# Patient Record
Sex: MALE | URBAN - METROPOLITAN AREA
[De-identification: names, ages, dates, MRNs, and addresses within clinical notes are randomized per-mention and may not be internally consistent; named-entity substitution may affect disease eponyms.]

---

## 2021-11-29 ENCOUNTER — OFFICE VISIT (OUTPATIENT)
Dept: URBAN - METROPOLITAN AREA CLINIC 4 | Facility: CLINIC | Age: 51
End: 2021-11-29

## 2021-11-29 ASSESSMENT — INTRAOCULAR PRESSURE
OD: 13
OS: 10

## 2021-11-29 ASSESSMENT — KERATOMETRY
OS: 45.00
OD: 44.25

## 2021-11-29 NOTE — IMPRESSION/PLAN
Impression: Other visual disturbance: H53.8. Plan: Laser Vision Correction Evaluation: Pentacam today shows regular astigmatism w/o signs of posterior corneal ectasia. Keratometry and pachymetry WNL. AR shows refractive error within corrective limits. Patient with reasonable expectations and no major contraindications for LASIK left eye only to improve distance vision. Contact lens trial today OS 0.00-1.25x020 for better distance/ OD no contact lens to retain near. Recommend formal evaluation for refractive surgery after discontinuing contact lenses for at least 2 weeks. Begin AT's and lid hygiene. Gave pricing and surgery dates available. RTC if interested.

## 2021-12-02 ENCOUNTER — TESTING ONLY (OUTPATIENT)
Dept: URBAN - METROPOLITAN AREA CLINIC 4 | Facility: CLINIC | Age: 51
End: 2021-12-02

## 2021-12-02 NOTE — IMPRESSION/PLAN
Impression: Other visual disturbance: H53.8. Plan: Repeat contact lens trial today OS 0.00-1.25x020 for better distance/ OD no contact lens to retain near. Plan to keep for 3-5 days and then remove, if happy with monovision, recommend a formal evaluation for LASIK in the left eye only.

## 2022-01-12 ENCOUNTER — OFFICE VISIT (OUTPATIENT)
Dept: URBAN - METROPOLITAN AREA CLINIC 4 | Facility: CLINIC | Age: 52
End: 2022-01-12

## 2022-01-12 DIAGNOSIS — H53.8 OTHER VISUAL DISTURBANCE: Primary | ICD-10-CM

## 2022-01-12 ASSESSMENT — KERATOMETRY
OS: 44.88
OD: 44.25

## 2022-01-12 NOTE — IMPRESSION/PLAN
Impression: Other visual disturbance: H53.8. Plan: Laser Vision Correction Evaluation: Repeat Pentacam today shows regular astigmatism w/o signs of posterior corneal ectasia. Keratometry and pachymetry WNL. AR shows refractive error within corrective limits. Patient underwent a successful monovision CL trial Left eye only corrected for distance (right with natural myopia). Desires to proceed with LASIK in the Left eye only. Plan to schedule a formal evaluation n/a.

## 2022-01-20 ENCOUNTER — REFRACTIVE (OUTPATIENT)
Dept: URBAN - METROPOLITAN AREA CLINIC 4 | Facility: CLINIC | Age: 52
End: 2022-01-20

## 2022-01-20 ASSESSMENT — KERATOMETRY
OD: 44.38
OS: 45.00

## 2022-01-21 ENCOUNTER — REFRACTIVE (OUTPATIENT)
Dept: URBAN - METROPOLITAN AREA CLINIC 4 | Facility: CLINIC | Age: 52
End: 2022-01-21

## 2022-01-22 ENCOUNTER — POST-OPERATIVE VISIT (OUTPATIENT)
Dept: URBAN - METROPOLITAN AREA CLINIC 4 | Facility: CLINIC | Age: 52
End: 2022-01-22

## 2022-01-22 PROCEDURE — 99024 POSTOP FOLLOW-UP VISIT: CPT | Performed by: OPTOMETRIST

## 2022-01-26 ENCOUNTER — POST-OPERATIVE VISIT (OUTPATIENT)
Dept: URBAN - METROPOLITAN AREA CLINIC 4 | Facility: CLINIC | Age: 52
End: 2022-01-26

## 2022-01-26 PROCEDURE — 99024 POSTOP FOLLOW-UP VISIT: CPT | Performed by: OPTOMETRIST

## 2022-01-26 ASSESSMENT — INTRAOCULAR PRESSURE
OS: 11
OD: 12

## 2022-01-26 ASSESSMENT — KERATOMETRY
OS: 43.88
OD: 44.25

## 2022-01-26 NOTE — IMPRESSION/PLAN
Impression: S/P LASIK - Customvue OS - 5 Days. Encounter for surgical aftercare following surgery on a sense organ  Z48.810. Plan: POW#1 s/p LASIK left eye - healing well, flap intact. Cont. Pred-Moxi QD until runs out. Continue Vitamin C 1,000mg PO QD, sunglasses protection and preservative free artificial tears but decrease to every 1 hour while awake and plan to taper one hour per week until next visit. Stop sleeping with goggles at night and can start to gently rub the eyes. F/U in 3-4 weeks, sooner PRN.

## 2022-02-16 ENCOUNTER — POST-OPERATIVE VISIT (OUTPATIENT)
Dept: URBAN - METROPOLITAN AREA CLINIC 4 | Facility: CLINIC | Age: 52
End: 2022-02-16

## 2022-02-16 DIAGNOSIS — Z48.810 ENCOUNTER FOR SURGICAL AFTERCARE FOLLOWING SURGERY ON A SENSE ORGAN: Primary | ICD-10-CM

## 2022-02-16 PROCEDURE — 99024 POSTOP FOLLOW-UP VISIT: CPT | Performed by: OPHTHALMOLOGY

## 2022-02-16 ASSESSMENT — INTRAOCULAR PRESSURE
OD: 9
OS: 9

## 2022-02-16 ASSESSMENT — KERATOMETRY
OS: 44.13
OD: 44.38

## 2022-02-16 NOTE — IMPRESSION/PLAN
Impression: S/P LASIK - Customvue OS - 26 Days. Encounter for surgical aftercare following surgery on a sense organ  Z48.810. Plan: POM#1 s/p LASIK left eye - well healed, off Pred-Moxi, patient happy with results. Stop Vitamin C. Cont. sunglasses protection and preservative free artificial tears QID prn. F/U at the anniversary of the surgery for a repeat dilated exam and final PO visit.

## 2023-01-16 ENCOUNTER — POST-OPERATIVE VISIT (OUTPATIENT)
Dept: URBAN - METROPOLITAN AREA CLINIC 4 | Facility: CLINIC | Age: 53
End: 2023-01-16

## 2023-01-16 DIAGNOSIS — Z48.810 ENCOUNTER FOR SURGICAL AFTERCARE FOLLOWING SURGERY ON A SENSE ORGAN: Primary | ICD-10-CM

## 2023-01-16 PROCEDURE — 99024 POSTOP FOLLOW-UP VISIT: CPT | Performed by: OPHTHALMOLOGY

## 2023-01-16 ASSESSMENT — INTRAOCULAR PRESSURE
OS: 10
OD: 7

## 2023-01-16 NOTE — IMPRESSION/PLAN
Impression: S/P LASIK OS - 360 Days. Encounter for surgical aftercare following surgery on a sense organ  Z48.810. Plan: POY#1 s/p LASIK left eye - well healed, patient happy with results. Cont. sunglasses protection and artificial tears prn.  F/U yearly for a repeat dilated exam.

## 2024-01-22 ENCOUNTER — POST-OPERATIVE VISIT (OUTPATIENT)
Dept: URBAN - METROPOLITAN AREA CLINIC 4 | Facility: CLINIC | Age: 54
End: 2024-01-22

## 2024-01-22 DIAGNOSIS — Z48.810 ENCOUNTER FOR SURGICAL AFTERCARE FOLLOWING SURGERY ON A SENSE ORGAN: Primary | ICD-10-CM

## 2024-01-22 PROCEDURE — 99024 POSTOP FOLLOW-UP VISIT: CPT | Performed by: OPHTHALMOLOGY

## 2024-01-22 ASSESSMENT — KERATOMETRY
OS: 44.13
OD: 44.38

## 2024-01-22 ASSESSMENT — INTRAOCULAR PRESSURE
OS: 10
OD: 12